# Patient Record
Sex: MALE | Race: WHITE | NOT HISPANIC OR LATINO | Employment: FULL TIME | URBAN - METROPOLITAN AREA
[De-identification: names, ages, dates, MRNs, and addresses within clinical notes are randomized per-mention and may not be internally consistent; named-entity substitution may affect disease eponyms.]

---

## 2018-09-11 ENCOUNTER — OFFICE VISIT - HEALTHEAST (OUTPATIENT)
Dept: FAMILY MEDICINE | Facility: CLINIC | Age: 23
End: 2018-09-11

## 2018-09-11 DIAGNOSIS — Z00.00 ROUTINE GENERAL MEDICAL EXAMINATION AT A HEALTH CARE FACILITY: ICD-10-CM

## 2018-09-11 DIAGNOSIS — G47.00 INSOMNIA: ICD-10-CM

## 2018-09-11 DIAGNOSIS — M79.A22 NONTRAUMATIC COMPARTMENT SYNDROME OF LEFT LOWER EXTREMITY: ICD-10-CM

## 2018-09-11 ASSESSMENT — MIFFLIN-ST. JEOR: SCORE: 1916.79

## 2018-09-11 NOTE — ASSESSMENT & PLAN NOTE
Mild.  The patient does not endorse anxiety nor PTSD is contributory.  We discussed sleep hygiene as well as a CBT I program.  Follow-up as needed.  Consider short course of sedative medication to help regulate sleep and reestablish pattern.

## 2018-09-11 NOTE — ASSESSMENT & PLAN NOTE
This patient is new to clinic.  He has a limited health history and is overall quite healthy.  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  Given his body habitus as well as lifestyle and lack of family history there is no specific preventative tests that should be completed.     -Return to clinic in 1 year.

## 2018-09-11 NOTE — ASSESSMENT & PLAN NOTE
"In approximately 2016, this patient developed compartment syndrome of his left lower extremity as result of overuse while in the .  He underwent fasciotomy which she describes as being \"unsuccessful.\"  He has daily pain when he is physically active.  He wonders if there any treatment interventions that might be useful.  -Referral to orthopedics.  The patient understands that there is unlikely to be any additional treatment modalities will be useful.  "

## 2018-10-03 ENCOUNTER — COMMUNICATION - HEALTHEAST (OUTPATIENT)
Dept: FAMILY MEDICINE | Facility: CLINIC | Age: 23
End: 2018-10-03

## 2019-05-29 ENCOUNTER — MEDICAL CORRESPONDENCE (OUTPATIENT)
Dept: HEALTH INFORMATION MANAGEMENT | Facility: CLINIC | Age: 24
End: 2019-05-29

## 2019-06-12 ENCOUNTER — TELEPHONE (OUTPATIENT)
Dept: CARDIOLOGY | Facility: CLINIC | Age: 24
End: 2019-06-12

## 2019-06-18 ENCOUNTER — TELEPHONE (OUTPATIENT)
Dept: CARDIOLOGY | Facility: CLINIC | Age: 24
End: 2019-06-18

## 2019-08-02 ENCOUNTER — OFFICE VISIT (OUTPATIENT)
Dept: CARDIOLOGY | Facility: CLINIC | Age: 24
End: 2019-08-02
Attending: GENETIC COUNSELOR, MS
Payer: COMMERCIAL

## 2019-08-02 DIAGNOSIS — R29.91 MARFANOID HABITUS: Primary | ICD-10-CM

## 2019-08-02 PROCEDURE — 96040 ZZH GENETIC COUNSELING, EACH 30 MINUTES: CPT | Mod: ZF | Performed by: GENETIC COUNSELOR, MS

## 2019-08-02 NOTE — LETTER
"8/2/2019      RE: Abdi Bello  [address]  [place] [id number]       Dear Colleague,    Thank you for the opportunity to participate in the care of your patient, Abdi Bello, at the [facility]. Please see a copy of my visit note below.    Here is a copy of the progress note from your recent genetic counseling visit to [facility] on Date: 8/2/2019.    PROGRESS NOTE: Abdi was seen for genetic counseling due to the possible diagnosis of Marfan syndrome.  I had the opportunity to meet with Abdi and his wife María Elena today to discuss the genetic component of Marfan syndrome, risk to future children, and testing options available to him.    MEDICAL HISTORY: Abdi reports that at 13 years of age the possibility of Marfan syndrome was raised due to the shape of his sternum, his height (6'4\") and history of chest pains and lightheadedness.  He reports that an ECHO was done at that time and was normal.  He is not sure where it occurred and has not had follow up ECHOs since that time.  He reports that he has stretch marks on his back and long fingers.  He denies any history of hypermobility, scoliosis, vision problems, pneumothorax, flat feet, or skin issues and is negative for wrist and thumb sign.  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    FAMILY HISTORY: A detailed family history was obtained at office visit on 8/2/2019.  (Please see scanned pedigree for details).  Family history was significant for the possibility of Marfan syndrome in his paternal uncle, who is 6'8\" tall with long fingers and pectus issues. He has no known aortic or ocular issues and it is unclear how his diagnosis was made.  Abdi's father is also tall (6'6\") with pectus issues.  Four other paternal uncles has no know aortic issues or questions of Marfan syndrome.    No information is known about Abdi's " mother or maternal half siblings.  There is no additional history of Marfan syndrome, connective tissue disorders, aortic issues, heart attacks, fainting, sudden cardiac death, genetic conditions, or birth defects.     DISCUSSION:  Explained that Marfan syndrome is associated with MVP, aortic aneurysms, tall stature, long fingers, scoliosis, pneumothorax, hypermobility, lens dislocation.  The gold standard for diagnosing Marfan syndrome is a clinical evaluation with a .  Therefore Abdi should consider further clinical evaluations including echocardiogram, appointment with  and ophthalmology.    Explained that 25% of Marfan cases are the result of new mutations, meaning that it was not inherited from an affected family member.  However, 75% of the time the condition is passed on from an affected parent.  Reviewed autosomal dominant (AD) inheritance associated Marfan syndrome including the 50% risk for recurrence.  Explained that if Abdi has Marfan syndrome, any future children would have a 50% risk of also having Marfan syndrome.  IF a mutation was identified, preimplantation and prenatal genetic testing would be available.      Genetic testing currently identifies mutations in about 75-90% of individuals meeting [place] clinical criteria. Reviewed capabilities, limitations, and logistics of testing.  Explained that symptoms of Marfan syndrome overlap with several other connective tissue disorders. Therefore, testing panels also include genes associated with other connective tissue disorders.    Genetic testing for Marfan syndrome is recommended for individuals with a clinical diagnosis or those with aortic aneurysms and suspicion of Marfan syndrome.     All questions answered at this time.     PLAN: Abdi will try to collect additional information about his uncle's diagnosis.  He will also set up an appointment for an ECHO and possibly a genetic evaluation, provided him with appropriate contact  information.  We will be in touch if further testing is recommended or elected.     TOTAL TIME SPENT IN COUNSELIN minutes    Willow Cifuentes MS  Licensed Genetic Counselor  [facility]

## 2019-08-05 NOTE — PROGRESS NOTES
"Here is a copy of the progress note from your recent genetic counseling visit to [facility] on Date: 8/2/2019.    PROGRESS NOTE: Abdi was seen for genetic counseling due to the possible diagnosis of Marfan syndrome.  I had the opportunity to meet with Abdi and his wife María Elena today to discuss the genetic component of Marfan syndrome, risk to future children, and testing options available to him.    MEDICAL HISTORY: Abdi reports that at 13 years of age the possibility of Marfan syndrome was raised due to the shape of his sternum, his height (6'4\") and history of chest pains and lightheadedness.  He reports that an ECHO was done at that time and was normal.  He is not sure where it occurred and has not had follow up ECHOs since that time.  He reports that he has stretch marks on his back and long fingers.  He denies any history of hypermobility, scoliosis, vision problems, pneumothorax, flat feet, or skin issues and is negative for wrist and thumb sign.  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    FAMILY HISTORY: A detailed family history was obtained at office visit on 8/2/2019.  (Please see scanned pedigree for details).  Family history was significant for the possibility of Marfan syndrome in his paternal uncle, who is 6'8\" tall with long fingers and pectus issues. He has no known aortic or ocular issues and it is unclear how his diagnosis was made.  Abdi's father is also tall (6'6\") with pectus issues.  Four other paternal uncles has no know aortic issues or questions of Marfan syndrome.    No information is known about Abdi's mother or maternal half siblings.  There is no additional history of Marfan syndrome, connective tissue disorders, aortic issues, heart attacks, fainting, sudden cardiac death, genetic conditions, or birth defects.     DISCUSSION:  Explained that Marfan syndrome is associated with MVP, aortic aneurysms, tall " stature, long fingers, scoliosis, pneumothorax, hypermobility, lens dislocation.  The gold standard for diagnosing Marfan syndrome is a clinical evaluation with a .  Therefore Abdi should consider further clinical evaluations including echocardiogram, appointment with  and ophthalmology.    Explained that 25% of Marfan cases are the result of new mutations, meaning that it was not inherited from an affected family member.  However, 75% of the time the condition is passed on from an affected parent.  Reviewed autosomal dominant (AD) inheritance associated Marfan syndrome including the 50% risk for recurrence.  Explained that if Abdi has Marfan syndrome, any future children would have a 50% risk of also having Marfan syndrome.  IF a mutation was identified, preimplantation and prenatal genetic testing would be available.      Genetic testing currently identifies mutations in about 75-90% of individuals meeting [place] clinical criteria. Reviewed capabilities, limitations, and logistics of testing.  Explained that symptoms of Marfan syndrome overlap with several other connective tissue disorders. Therefore, testing panels also include genes associated with other connective tissue disorders.    Genetic testing for Marfan syndrome is recommended for individuals with a clinical diagnosis or those with aortic aneurysms and suspicion of Marfan syndrome.     All questions answered at this time.     PLAN: Abdi will try to collect additional information about his uncle's diagnosis.  He will also set up an appointment for an ECHO and possibly a genetic evaluation, provided him with appropriate contact information.  We will be in touch if further testing is recommended or elected.     TOTAL TIME SPENT IN COUNSELIN minutes    Willow Cifuentes MS  Licensed Genetic Counselor  [facility]

## 2020-03-11 ENCOUNTER — HEALTH MAINTENANCE LETTER (OUTPATIENT)
Age: 25
End: 2020-03-11

## 2021-01-03 ENCOUNTER — HEALTH MAINTENANCE LETTER (OUTPATIENT)
Age: 26
End: 2021-01-03

## 2021-04-25 ENCOUNTER — HEALTH MAINTENANCE LETTER (OUTPATIENT)
Age: 26
End: 2021-04-25

## 2021-06-02 VITALS — HEIGHT: 76 IN | WEIGHT: 187 LBS | BODY MASS INDEX: 22.77 KG/M2

## 2021-06-16 PROBLEM — G47.00 INSOMNIA: Status: ACTIVE | Noted: 2018-09-11

## 2021-06-16 PROBLEM — M79.A22 NONTRAUMATIC COMPARTMENT SYNDROME OF LEFT LOWER EXTREMITY: Status: ACTIVE | Noted: 2018-09-11

## 2021-06-16 PROBLEM — Z00.00 ROUTINE GENERAL MEDICAL EXAMINATION AT A HEALTH CARE FACILITY: Status: ACTIVE | Noted: 2018-09-11

## 2021-06-20 NOTE — PROGRESS NOTES
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  He underwent fasciotomy which she describes as being \"unsuccessful.\"  He has daily pain when he is physically active.  He wonders if there any treatment interventions that might be useful.  -Referral to orthopedics.  The patient understands that there is unlikely to be any additional treatment modalities will be useful.         Relevant Orders    Ambulatory referral to Orthopedic Surgery    Insomnia     Mild.  The patient does not endorse anxiety nor PTSD is contributory.  We discussed sleep hygiene as well as a CBT I program.  Follow-up as needed.  Consider short course of sedative medication to help regulate sleep and reestablish pattern.         Routine general medical examination at a health care facility - Primary     This patient is new to clinic.  He has a limited health history and is overall quite healthy.  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  Given his body habitus as well as lifestyle and lack of family history there is no specific preventative tests that should be completed.     -Return to clinic in 1 year.             Next follow up:  Return in about 1 year (around 9/11/2019) for Annual physical.    Immunization Review  Adult Imm Review: No immunizations due today  Pt does not use tobacco products   I discussed the following with the patient:   Adult Healthy Living: Importance of regular exercise    I have had an Advance Directives discussion with the patient.    Subjective:   Chief Complaint: Abdi Bello is an 23 y.o. male here for a preventative health visit.     HPI:  History of compartment syndrome and fascitomy in 2016 in his left lower extremity.  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  He continues to have pain.  Walks have been painful.  He takes NSAIDS.  He works during the summer and on weekends.  Student.  Studying physics. Pain starts with swelling, nerve damage and pressure.       Sleep: he struggles to fall asleep.  Worse since deployment.  No combat.     Healthy Habits  Are you taking a daily aspirin? No  Do you typically exercising at least 40 min, 3-4 times per week?  Yes  Do you usually eat at least 4 servings of fruit and vegetables a day, include whole grains and fiber and avoid regularly eating high fat foods? Yes  Have you had an eye exam in the past two years? NO  Do you see a dentist twice per year? NO  Do you have any concerns regarding sleep? YES    Safety Screen  If you own firearms, are they secured in a locked gun cabinet or with trigger locks? The patient does not own any firearms  Do you feel you are safe where you are living?: Yes (9/11/2018  3:04 PM)  Do you feel you are safe in your relationship(s)?: Yes (9/11/2018  3:04 PM)    Review of Systems:  Please see above.  The rest of the review of systems are negative for all systems.     Cancer Screening     Patient has no health maintenance due at this time        Patient Care Team:  Kan Dubose MD as PCP - General (Family Medicine)    History     Reviewed By Date/Time Sections Reviewed    Kan Dubose MD 9/11/2018  3:40 PM Medical, Surgical, Tobacco, Alcohol, Drug Use, Sexual Activity, Family    Kan Dubose MD 9/11/2018  3:18 PM Medical    Keke Odell LPN 9/11/2018  3:04 PM Tobacco    Keke Odell LPN 9/11/2018  3:03 PM Surgical, Tobacco, Alcohol, Drug Use, Sexual Activity    Keke Odell LPN 9/11/2018  3:02 PM Family    Keke Odell LPN 9/11/2018  3:01 PM Family            Objective:   Vital Signs:   Visit Vitals     /68 (Patient Site: Left Arm, Patient Position: Sitting,  "Cuff Size: Adult Regular)     Pulse 90     Temp 97.7  F (36.5  C) (Oral)     Resp 20     Ht 6' 3.5\" (1.918 m)     Wt 187 lb (84.8 kg)     SpO2 98%  Comment: room air     BMI 23.06 kg/m2          PHYSICAL EXAM  Physical Exam   Constitutional: He is oriented to person, place, and time. He appears well-developed. No distress.   HENT:   Head: Normocephalic and atraumatic.   Right Ear: External ear normal.   Left Ear: External ear normal.   Nose: Nose normal.   Mouth/Throat: Oropharynx is clear and moist. No oropharyngeal exudate.   Eyes: Conjunctivae and EOM are normal. Pupils are equal, round, and reactive to light. Right eye exhibits no discharge. Left eye exhibits no discharge. No scleral icterus.   Neck: Normal range of motion. No thyromegaly present.   Cardiovascular: Normal rate, regular rhythm, normal heart sounds and intact distal pulses.  Exam reveals no gallop and no friction rub.    No murmur heard.  Pulmonary/Chest: Effort normal and breath sounds normal. No respiratory distress. He has no wheezes.   Abdominal: Soft. He exhibits no distension and no mass. There is no tenderness.   Genitourinary: Testes normal and penis normal. Right testis shows no mass and no tenderness. Left testis shows no mass and no tenderness. Circumcised.   Musculoskeletal: Normal range of motion. He exhibits no edema.   Lymphadenopathy:     He has no cervical adenopathy.   Neurological: He is alert and oriented to person, place, and time. He has normal reflexes. No cranial nerve deficit. He exhibits normal muscle tone.   Skin: Skin is warm.   Psychiatric: He has a normal mood and affect. His behavior is normal. Judgment and thought content normal.   Vitals reviewed.         Medication List      Notice  As of 9/11/2018  3:43 PM    You have not been prescribed any medications.          Additional Screenings Completed Today:       "

## 2021-10-10 ENCOUNTER — HEALTH MAINTENANCE LETTER (OUTPATIENT)
Age: 26
End: 2021-10-10

## 2022-05-21 ENCOUNTER — HEALTH MAINTENANCE LETTER (OUTPATIENT)
Age: 27
End: 2022-05-21

## 2022-09-18 ENCOUNTER — HEALTH MAINTENANCE LETTER (OUTPATIENT)
Age: 27
End: 2022-09-18

## 2023-02-17 ENCOUNTER — OFFICE VISIT (OUTPATIENT)
Dept: PEDIATRICS | Facility: CLINIC | Age: 28
End: 2023-02-17
Payer: COMMERCIAL

## 2023-02-17 VITALS
HEART RATE: 77 BPM | WEIGHT: 172.4 LBS | SYSTOLIC BLOOD PRESSURE: 102 MMHG | TEMPERATURE: 97.1 F | DIASTOLIC BLOOD PRESSURE: 70 MMHG | HEIGHT: 75 IN | BODY MASS INDEX: 21.44 KG/M2 | OXYGEN SATURATION: 100 % | RESPIRATION RATE: 16 BRPM

## 2023-02-17 DIAGNOSIS — M54.2 CERVICALGIA: ICD-10-CM

## 2023-02-17 DIAGNOSIS — R63.4 WEIGHT LOSS: ICD-10-CM

## 2023-02-17 DIAGNOSIS — G62.9 PERIPHERAL POLYNEUROPATHY: ICD-10-CM

## 2023-02-17 DIAGNOSIS — Z00.00 ROUTINE GENERAL MEDICAL EXAMINATION AT A HEALTH CARE FACILITY: Primary | ICD-10-CM

## 2023-02-17 PROCEDURE — 99213 OFFICE O/P EST LOW 20 MIN: CPT | Mod: 25 | Performed by: NURSE PRACTITIONER

## 2023-02-17 PROCEDURE — 99395 PREV VISIT EST AGE 18-39: CPT | Performed by: NURSE PRACTITIONER

## 2023-02-17 RX ORDER — GABAPENTIN 600 MG/1
2 TABLET ORAL 3 TIMES DAILY
COMMUNITY
Start: 2023-01-04

## 2023-02-17 SDOH — ECONOMIC STABILITY: TRANSPORTATION INSECURITY
IN THE PAST 12 MONTHS, HAS LACK OF TRANSPORTATION KEPT YOU FROM MEETINGS, WORK, OR FROM GETTING THINGS NEEDED FOR DAILY LIVING?: NO

## 2023-02-17 SDOH — ECONOMIC STABILITY: INCOME INSECURITY: HOW HARD IS IT FOR YOU TO PAY FOR THE VERY BASICS LIKE FOOD, HOUSING, MEDICAL CARE, AND HEATING?: NOT HARD AT ALL

## 2023-02-17 SDOH — ECONOMIC STABILITY: FOOD INSECURITY: WITHIN THE PAST 12 MONTHS, YOU WORRIED THAT YOUR FOOD WOULD RUN OUT BEFORE YOU GOT MONEY TO BUY MORE.: NEVER TRUE

## 2023-02-17 SDOH — HEALTH STABILITY: PHYSICAL HEALTH: ON AVERAGE, HOW MANY DAYS PER WEEK DO YOU ENGAGE IN MODERATE TO STRENUOUS EXERCISE (LIKE A BRISK WALK)?: 4 DAYS

## 2023-02-17 SDOH — HEALTH STABILITY: PHYSICAL HEALTH: ON AVERAGE, HOW MANY MINUTES DO YOU ENGAGE IN EXERCISE AT THIS LEVEL?: 60 MIN

## 2023-02-17 SDOH — ECONOMIC STABILITY: FOOD INSECURITY: WITHIN THE PAST 12 MONTHS, THE FOOD YOU BOUGHT JUST DIDN'T LAST AND YOU DIDN'T HAVE MONEY TO GET MORE.: NEVER TRUE

## 2023-02-17 SDOH — ECONOMIC STABILITY: TRANSPORTATION INSECURITY
IN THE PAST 12 MONTHS, HAS THE LACK OF TRANSPORTATION KEPT YOU FROM MEDICAL APPOINTMENTS OR FROM GETTING MEDICATIONS?: NO

## 2023-02-17 SDOH — ECONOMIC STABILITY: INCOME INSECURITY: IN THE LAST 12 MONTHS, WAS THERE A TIME WHEN YOU WERE NOT ABLE TO PAY THE MORTGAGE OR RENT ON TIME?: NO

## 2023-02-17 ASSESSMENT — ENCOUNTER SYMPTOMS
ARTHRALGIAS: 0
DIARRHEA: 0
PARESTHESIAS: 0
NAUSEA: 0
EYE PAIN: 0
FREQUENCY: 0
CHILLS: 0
MYALGIAS: 0
WEAKNESS: 0
COUGH: 0
HEMATOCHEZIA: 0
DYSURIA: 0
CONSTIPATION: 0
HEMATURIA: 0
DIZZINESS: 0
HEARTBURN: 0
JOINT SWELLING: 0
FEVER: 0
NERVOUS/ANXIOUS: 0
PALPITATIONS: 0
ABDOMINAL PAIN: 0
HEADACHES: 0
SORE THROAT: 0
SHORTNESS OF BREATH: 0

## 2023-02-17 ASSESSMENT — SOCIAL DETERMINANTS OF HEALTH (SDOH)
HOW OFTEN DO YOU ATTEND CHURCH OR RELIGIOUS SERVICES?: NEVER
HOW OFTEN DO YOU GET TOGETHER WITH FRIENDS OR RELATIVES?: TWICE A WEEK
IN A TYPICAL WEEK, HOW MANY TIMES DO YOU TALK ON THE PHONE WITH FAMILY, FRIENDS, OR NEIGHBORS?: TWICE A WEEK
DO YOU BELONG TO ANY CLUBS OR ORGANIZATIONS SUCH AS CHURCH GROUPS UNIONS, FRATERNAL OR ATHLETIC GROUPS, OR SCHOOL GROUPS?: NO

## 2023-02-17 ASSESSMENT — LIFESTYLE VARIABLES
HOW OFTEN DO YOU HAVE A DRINK CONTAINING ALCOHOL: 2-4 TIMES A MONTH
SKIP TO QUESTIONS 9-10: 1
HOW OFTEN DO YOU HAVE SIX OR MORE DRINKS ON ONE OCCASION: NEVER
HOW MANY STANDARD DRINKS CONTAINING ALCOHOL DO YOU HAVE ON A TYPICAL DAY: 1 OR 2
AUDIT-C TOTAL SCORE: 2

## 2023-02-17 ASSESSMENT — PAIN SCALES - GENERAL: PAINLEVEL: MILD PAIN (2)

## 2023-02-17 NOTE — PROGRESS NOTES
SUBJECTIVE:   CC: Abdi is an 27 year old who presents for preventative health visit.     Patient has been advised of split billing requirements and indicates understanding: Yes     Healthy Habits:     Getting at least 3 servings of Calcium per day:  Yes    Bi-annual eye exam:  NO    Dental care twice a year:  Yes    Sleep apnea or symptoms of sleep apnea:  None    Diet:  Regular (no restrictions)    Frequency of exercise:  2-3 days/week    Duration of exercise:  30-45 minutes    Taking medications regularly:  Yes    Medication side effects:  None    PHQ-2 Total Score: 0    Additional concerns today:  Yes    Doesn't really track his weight. Has been 185-190# over the past couple of years but over the past year has been more 170-175#. This weight change seems to coincide with [facility] - gabapentin, antidepressant, muscle relaxant.  Entered [employer] at 270#. 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 Heaviest was 205#. Normal bowel movements. Skips breakfast, eats lunch and dinner. This is his baseline meal pattern.     Gabapentin for neuropathy related to cervical fusion in 2019, nerve pain in arms and tingling in foot.     Denies history of depression or anxiety.     Today's PHQ-2 Score:   PHQ-2 ( 1999 [employer]) 2/17/2023   Q1: Little interest or pleasure in doing things 0   Q2: Feeling down, depressed or hopeless 0   PHQ-2 Score 0   PHQ-2 Total Score (12-17 Years)- Positive if 3 or more points; Administer PHQ-A if positive -   Q1: Little interest or pleasure in doing things Not at all   Q2: Feeling down, depressed or hopeless Not at all   PHQ-2 Score 0       Social History     Tobacco Use     Smoking status: Never     Smokeless tobacco: Former   Substance Use Topics     Alcohol use: Yes     Comment: Alcoholic Drinks/day: 2-3 drinks per week          Alcohol Use 2/17/2023   Prescreen: >3 drinks/day or >7 drinks/week? No       Last PSA: No results found for: PSA    Reviewed orders  with patient. Reviewed health maintenance and updated orders accordingly - Yes  BP Readings from Last 3 Encounters:   02/17/23 102/70    Wt Readings from Last 3 Encounters:   02/17/23 78.2 kg (172 lb 6.4 oz)   09/11/18 84.8 kg (187 lb)         Reviewed and updated as needed this visit by clinical staff                  Reviewed and updated as needed this visit by Provider                 Patient Active Problem List   Diagnosis     Nontraumatic compartment syndrome of left lower extremity     Insomnia     Routine general medical examination at a health care facility     Past Medical History:   Diagnosis Date     Compartment syndrome (H)     left leg     Neck pain      Peripheral neuropathy      Past Surgical History:   Procedure Laterality Date     APPENDECTOMY       HEAD & NECK SURGERY  March 2019    ACDF     LEG SURGERY      in [facility]      SOFT TISSUE SURGERY  May 2015    4-Compartment Release (Left Calf)     Family History   Problem Relation Age of Onset     No Known Problems Father      No Known Problems Brother      No Known Problems Paternal Grandmother      No Known Problems Paternal Grandfather      Breast Cancer No family hx of      Colon Cancer No family hx of      Prostate Cancer No family hx of      Heart Disease No family hx of      Diabetes No family hx of      Social History     Socioeconomic History     Marital status:      Spouse name: Not on file     Number of children: Not on file     Years of education: Not on file     Highest education level: Not on file   Occupational History     Not on file   Tobacco Use     Smoking status: Never     Smokeless tobacco: Never   Vaping Use     Vaping Use: Never used   Substance and Sexual Activity     Alcohol use: Yes     Comment: Alcoholic Drinks/day: 2-3 drinks per week      Drug use: No     Sexual activity: Yes     Partners: Female   Other Topics Concern     Parent/sibling w/ CABG, MI or angioplasty before 65F 55M? No   Social History Narrative     .     25 month old (girl) and 12 day old (girl).     Was in the [employer], has been out since 2016.     Works as a .     Wife is .     Free time: play with kids.         Katya Bacon, DNP, APRN, CNP    02/17/23     Social Determinants of Health     Financial Resource Strain: Low Risk      Difficulty of Paying Living Expenses: Not hard at all   Food Insecurity: No Food Insecurity     Worried About Running Out of Food in the Last Year: Never true     Ran Out of Food in the Last Year: Never true   Transportation Needs: No Transportation Needs     Lack of Transportation (Medical): No     Lack of Transportation (Non-Medical): No   Physical Activity: Sufficiently Active     Days of Exercise per Week: 4 days     Minutes of Exercise per Session: 60 min   Stress: No Stress Concern Present     Feeling of Stress : Only a little   Social Connections: Moderately Isolated     Frequency of Communication with Friends and Family: Twice a week     Frequency of Social Gatherings with Friends and Family: Twice a week     Attends [employer] Services: Never     Active Member of Clubs or Organizations: No     Attends Club or Organization Meetings: Not on file     Marital Status:    Intimate Partner Violence: Not on file   Housing Stability: Low Risk      Unable to Pay for Housing in the Last Year: No     Number of Places Lived in the Last Year: 1     Unstable Housing in the Last Year: No     Current Outpatient Medications   Medication     gabapentin (NEURONTIN) 600 MG tablet     No current facility-administered medications for this visit.      No Known Allergies      Review of Systems   Constitutional: Negative for chills and fever.   HENT: Negative for congestion, ear pain, hearing loss and sore throat.    Eyes: Negative for pain and visual disturbance.   Respiratory: Negative for cough and shortness of breath.    Cardiovascular: Negative for chest pain, palpitations and peripheral  "edema.   Gastrointestinal: Negative for abdominal pain, constipation, diarrhea, heartburn, hematochezia and nausea.   Genitourinary: Negative for dysuria, frequency, genital sores, hematuria and urgency.   Musculoskeletal: Negative for arthralgias, joint swelling and myalgias.   Skin: Negative for rash.   Neurological: Negative for dizziness, weakness, headaches and paresthesias.   Psychiatric/Behavioral: Negative for mood changes. The patient is not nervous/anxious.        OBJECTIVE:   /70 (BP Location: Right arm, Patient Position: Sitting, Cuff Size: Adult Regular)   Pulse 77   Temp 97.1  F (36.2  C) (Tympanic)   Resp 16   Ht 1.905 m (6' 3\")   Wt 78.2 kg (172 lb 6.4 oz)   SpO2 100%   BMI 21.55 kg/m      Physical Exam  Constitutional: appears to be in no acute distress, comfortable, pleasant.   Eyes: anicteric, conjunctiva clear without drainage or erythema. YOLETTE.   Ears, Nose and Throat: tympanic membranes gray with LR,  nose without nasal discharge. OP: no erythema to posterior pharynx, negative post nasal drainage, tonsils +1 no erythema or exudate.  Neck: supple, thyroid palpable,not enlarged, no nodules   Cardiovascular: regular rate and rhythm, normal S1 and S2, no murmurs, rubs or gallops, peripheral pulses full and symmetric; negative peripheral edema   Respiratory: Air entry throughout. Breathing pattern unlabored without the use of accessory muscles. Clear to auscultation A and P, no wheezes or crackles, normal breath sounds.    Gastrointestinal: rounded, soft. Positive bowel sounds x4, nontender, no masses.   Musculoskeletal: full range of motion, no edema.   Skin: pink, turgor smooth and elastic. Negative for lesions or dryness.  Neurological: normal gait, no tremor.   Psychological: appropriate mood and affect.   Lymphatic: no cervical, axillary, supraclavicular, or infraclavicular lymphadenopathy.    Diagnostic Test Results:  Labs reviewed in Epic    ASSESSMENT/PLAN:   (Z00.00) Routine " general medical examination at a health care facility  (primary encounter diagnosis)  Age appropriate screening and preventative care have been addressed today. Vaccinations have been reviewed - declines COVID and flu vaccine. Reports he has had tetanus vaccine more recently than 2006 so declines this today as well. Patient has been advised to follow a balanced diet. They have been advised to undertake routine aerobic activity and they were counseled on healthy weight maintenance. Recommend annual vision exams as well as biannual dental exams. They will follow up for annual physical again in one year.   - Comprehensive metabolic panel (BMP + Alb, Alk Phos, ALT, AST, Total. Bili, TP)  - Lipid panel reflex to direct LDL Fasting          (R63.4) Weight loss  About a 10-15 pound unintentional weight loss over the past year. No acute red flags identified. Will do some labs today. If all labs normal, recommend more regular weights for monitoring purposes and if weight loss continues, he is to follow-up.   - CBC with platelets and differential  - TSH with free T4 reflex  - Comprehensive metabolic panel (BMP + Alb, Alk Phos, ALT, AST, Total. Bili, TP)  - OFFICE/OUTPT VISIT,EST,LEVL III    (M54.2) Cervicalgia  (G62.9) Peripheral polyneuropathy  Taking gabapentin 1200 mg TID, which he is stable on. [facility].            Follow-up:  - Return for fasting labs.      COUNSELING:   Reviewed preventive health counseling, as reflected in patient instructions  Special attention given to:        Regular exercise       Healthy diet/nutrition       Vision screening       Immunizations       Consider Hep C screening for all patients one time for ages 18-79 years       HIV screeninx in teen years, 1x in adult years, and at intervals if high risk      He reports that he has never smoked. He has quit using smokeless tobacco.            ARYA Fierro CNP  [facility]

## 2023-03-01 ENCOUNTER — LAB (OUTPATIENT)
Dept: LAB | Facility: CLINIC | Age: 28
End: 2023-03-01
Payer: COMMERCIAL

## 2023-03-01 DIAGNOSIS — R63.4 WEIGHT LOSS: ICD-10-CM

## 2023-03-01 DIAGNOSIS — Z00.00 ROUTINE GENERAL MEDICAL EXAMINATION AT A HEALTH CARE FACILITY: ICD-10-CM

## 2023-03-01 LAB
ALBUMIN SERPL BCG-MCNC: 4.7 G/DL (ref 3.5–5.2)
ALP SERPL-CCNC: 44 U/L (ref 40–129)
ALT SERPL W P-5'-P-CCNC: 20 U/L (ref 10–50)
ANION GAP SERPL CALCULATED.3IONS-SCNC: 13 MMOL/L (ref 7–15)
AST SERPL W P-5'-P-CCNC: 21 U/L (ref 10–50)
BASOPHILS # BLD AUTO: 0 10E3/UL (ref 0–0.2)
BASOPHILS NFR BLD AUTO: 1 %
BILIRUB SERPL-MCNC: 0.9 MG/DL
BUN SERPL-MCNC: 15.5 MG/DL (ref 6–20)
CALCIUM SERPL-MCNC: 9.6 MG/DL (ref 8.6–10)
CHLORIDE SERPL-SCNC: 101 MMOL/L (ref 98–107)
CHOLEST SERPL-MCNC: 132 MG/DL
CREAT SERPL-MCNC: 1.01 MG/DL (ref 0.67–1.17)
DEPRECATED HCO3 PLAS-SCNC: 25 MMOL/L (ref 22–29)
EOSINOPHIL # BLD AUTO: 0.1 10E3/UL (ref 0–0.7)
EOSINOPHIL NFR BLD AUTO: 1 %
ERYTHROCYTE [DISTWIDTH] IN BLOOD BY AUTOMATED COUNT: 12.3 % (ref 10–15)
GFR SERPL CREATININE-BSD FRML MDRD: >90 ML/MIN/1.73M2
GLUCOSE SERPL-MCNC: 86 MG/DL (ref 70–99)
HCT VFR BLD AUTO: 44.2 % (ref 40–53)
HDLC SERPL-MCNC: 76 MG/DL
HGB BLD-MCNC: 14.7 G/DL (ref 13.3–17.7)
LDLC SERPL CALC-MCNC: 43 MG/DL
LYMPHOCYTES # BLD AUTO: 2 10E3/UL (ref 0.8–5.3)
LYMPHOCYTES NFR BLD AUTO: 34 %
MCH RBC QN AUTO: 31.3 PG (ref 26.5–33)
MCHC RBC AUTO-ENTMCNC: 33.3 G/DL (ref 31.5–36.5)
MCV RBC AUTO: 94 FL (ref 78–100)
MONOCYTES # BLD AUTO: 0.6 10E3/UL (ref 0–1.3)
MONOCYTES NFR BLD AUTO: 10 %
NEUTROPHILS # BLD AUTO: 3.2 10E3/UL (ref 1.6–8.3)
NEUTROPHILS NFR BLD AUTO: 55 %
NONHDLC SERPL-MCNC: 56 MG/DL
PLATELET # BLD AUTO: 229 10E3/UL (ref 150–450)
POTASSIUM SERPL-SCNC: 4.2 MMOL/L (ref 3.4–5.3)
PROT SERPL-MCNC: 7.2 G/DL (ref 6.4–8.3)
RBC # BLD AUTO: 4.69 10E6/UL (ref 4.4–5.9)
SODIUM SERPL-SCNC: 139 MMOL/L (ref 136–145)
TRIGL SERPL-MCNC: 65 MG/DL
TSH SERPL DL<=0.005 MIU/L-ACNC: 0.78 UIU/ML (ref 0.3–4.2)
WBC # BLD AUTO: 5.9 10E3/UL (ref 4–11)

## 2023-03-01 PROCEDURE — 36415 COLL VENOUS BLD VENIPUNCTURE: CPT

## 2023-03-01 PROCEDURE — 80061 LIPID PANEL: CPT

## 2023-03-01 PROCEDURE — 80050 GENERAL HEALTH PANEL: CPT

## 2024-01-18 ENCOUNTER — PATIENT OUTREACH (OUTPATIENT)
Dept: CARE COORDINATION | Facility: CLINIC | Age: 29
End: 2024-01-18
Payer: COMMERCIAL

## 2024-02-01 ENCOUNTER — PATIENT OUTREACH (OUTPATIENT)
Dept: CARE COORDINATION | Facility: CLINIC | Age: 29
End: 2024-02-01
Payer: COMMERCIAL

## 2024-05-05 ENCOUNTER — HEALTH MAINTENANCE LETTER (OUTPATIENT)
Age: 29
End: 2024-05-05

## 2025-01-22 ENCOUNTER — TELEPHONE (OUTPATIENT)
Dept: DERMATOLOGY | Facility: CLINIC | Age: 30
End: 2025-01-22
Payer: COMMERCIAL

## 2025-01-22 NOTE — TELEPHONE ENCOUNTER
Online Appointment Request      Health Call Center  Phone Message      Reason for Call: Appointment Intake     Patients Requests:    Reason for appointment  Mole/Skin check  Preferred Provider  Molly Wade  Preferred Location  [facility]  Preferred Visit Type  In-person        Action taken: Other: none; Documenting patient was called; detail message was left for patient to call back.

## 2025-05-17 ENCOUNTER — HEALTH MAINTENANCE LETTER (OUTPATIENT)
Age: 30
End: 2025-05-17